# Patient Record
(demographics unavailable — no encounter records)

---

## 2025-02-07 NOTE — HISTORY OF PRESENT ILLNESS
[FreeTextEntry1] : JAMEL MIMS 34 YO, presents for Annual & delayed menses G 2 P 0010 1 Miscarriage @ 5 weeks LMP: Unsure Embryo transfer done on 12/13/24 in [place] PGD was done prior to ET. Medication: Vit D, PNV, Baby aspirin. HARMEET given - 09/01/25 No family history of breast cancer. To do monthly SBE No health issues, nonsmoker, NKDA. For Sonogram today.  For NT @ [facility]- 2/17 to 2/28.

## 2025-02-07 NOTE — PROCEDURE
[Cervical Pap Smear] : cervical Pap smear [Liquid Base] : liquid base [GC & Chlamydia via Pap] : GC & Chlamydia via Pap [Tolerated Well] : the patient tolerated the procedure well [No Complications] : there were no complications [Transvaginal OB Sonogram] : Transvaginal OB Sonogram [Intrauterine Pregnancy] : intrauterine pregnancy [Fetal Heart] : fetal heart present [Date: ___] : Date: [unfilled] [Current GA by Sonogram: ___ (wks)] : Current GA by Sonogram: [unfilled]Uwks [___ day(s)] : [unfilled] days [Transvaginal OB Sonogram WNL] : Transvaginal OB Sonogram WNL [FreeTextEntry1] : Viable SIUP @ 10.4 weeks.  HARMEET- 09/01/25.